# Patient Record
Sex: MALE | Race: WHITE | NOT HISPANIC OR LATINO | Employment: FULL TIME | URBAN - METROPOLITAN AREA
[De-identification: names, ages, dates, MRNs, and addresses within clinical notes are randomized per-mention and may not be internally consistent; named-entity substitution may affect disease eponyms.]

---

## 2019-07-03 ENCOUNTER — OFFICE VISIT (OUTPATIENT)
Dept: INTERNAL MEDICINE | Facility: CLINIC | Age: 29
End: 2019-07-03
Payer: COMMERCIAL

## 2019-07-03 VITALS
BODY MASS INDEX: 25.51 KG/M2 | HEIGHT: 66 IN | SYSTOLIC BLOOD PRESSURE: 116 MMHG | HEART RATE: 88 BPM | WEIGHT: 158.75 LBS | OXYGEN SATURATION: 98 % | DIASTOLIC BLOOD PRESSURE: 62 MMHG

## 2019-07-03 DIAGNOSIS — M54.50 ACUTE LOW BACK PAIN WITHOUT SCIATICA, UNSPECIFIED BACK PAIN LATERALITY: Primary | ICD-10-CM

## 2019-07-03 PROCEDURE — 99999 PR PBB SHADOW E&M-NEW PATIENT-LVL III: ICD-10-PCS | Mod: PBBFAC,,, | Performed by: INTERNAL MEDICINE

## 2019-07-03 PROCEDURE — 99203 OFFICE O/P NEW LOW 30 MIN: CPT | Mod: S$GLB,,, | Performed by: INTERNAL MEDICINE

## 2019-07-03 PROCEDURE — 99203 PR OFFICE/OUTPT VISIT, NEW, LEVL III, 30-44 MIN: ICD-10-PCS | Mod: S$GLB,,, | Performed by: INTERNAL MEDICINE

## 2019-07-03 PROCEDURE — 3008F BODY MASS INDEX DOCD: CPT | Mod: CPTII,S$GLB,, | Performed by: INTERNAL MEDICINE

## 2019-07-03 PROCEDURE — 99999 PR PBB SHADOW E&M-NEW PATIENT-LVL III: CPT | Mod: PBBFAC,,, | Performed by: INTERNAL MEDICINE

## 2019-07-03 PROCEDURE — 3008F PR BODY MASS INDEX (BMI) DOCUMENTED: ICD-10-PCS | Mod: CPTII,S$GLB,, | Performed by: INTERNAL MEDICINE

## 2019-07-03 RX ORDER — CYCLOBENZAPRINE HCL 10 MG
TABLET ORAL
Qty: 30 TABLET | Refills: 1 | Status: SHIPPED | OUTPATIENT
Start: 2019-07-03

## 2019-07-03 RX ORDER — MELOXICAM 15 MG/1
15 TABLET ORAL DAILY
Qty: 30 TABLET | Refills: 1 | Status: SHIPPED | OUTPATIENT
Start: 2019-07-03

## 2019-07-03 RX ORDER — DULOXETIN HYDROCHLORIDE 60 MG/1
CAPSULE, DELAYED RELEASE ORAL
COMMUNITY
Start: 2019-02-01

## 2019-07-03 NOTE — PROGRESS NOTES
Subjective:       Patient ID: Samy Pena is a 28 y.o. male.    Chief Complaint: Back Pain    HPI   1 month ago moved a heavy TV.    Had central lower back pain after.  Aggravated by picking up 30 lb dog.  Now c/o pain with lying down - has spasms.  Walking steps today caused severe pain.  Stood against wall and felt better..  Some pain in legs today.  No numbness, weakness.  He has taken tylenol, stretching (helps a bit).  Advil not helpful (200 mg)..   Pain is usually mild, but sometimes severe.    No fever, bladder or bowel incontinence.    .  No exercise.    Social anxiety, tx with cymbalta  Review of Systems   Constitutional: Negative for activity change and unexpected weight change.   Respiratory: Negative for chest tightness and shortness of breath.    Cardiovascular: Negative for chest pain and leg swelling.   Gastrointestinal: Negative for abdominal pain.   Musculoskeletal: Positive for back pain.   Neurological: Negative for headaches.   Psychiatric/Behavioral: Negative for dysphoric mood. The patient is nervous/anxious.        Objective:      Physical Exam   Constitutional: He is oriented to person, place, and time. He appears well-developed and well-nourished. No distress.   Neurological: He is alert and oriented to person, place, and time.   Psychiatric: He has a normal mood and affect. His behavior is normal.       Assessment:       1. Acute low back pain without sciatica, unspecified back pain laterality        Plan:       Samy was seen today for back pain.    Diagnoses and all orders for this visit:    Acute low back pain without sciatica, unspecified back pain laterality  -     meloxicam (MOBIC) 15 MG tablet; Take 1 tablet (15 mg total) by mouth once daily.  -     cyclobenzaprine (FLEXERIL) 10 MG tablet; Half -1 tab po q hs for back spasm       Ice to back.       Call if no better.

## 2019-07-03 NOTE — PATIENT INSTRUCTIONS
Meloxicam 15 mg daily - (anti-inflammatory) once a day with food.  Don't mix with advil, aleve, etc.    Flexeril (muscle relaxer) half-1 tab at bedtime.    Ice to back.    walk

## 2025-07-03 ENCOUNTER — HOSPITAL ENCOUNTER (OUTPATIENT)
Dept: RADIOLOGY | Facility: HOSPITAL | Age: 35
Discharge: HOME | End: 2025-07-03
Attending: INTERNAL MEDICINE
Payer: COMMERCIAL

## 2025-07-03 ENCOUNTER — OFFICE VISIT (OUTPATIENT)
Dept: INTERNAL MEDICINE | Facility: CLINIC | Age: 35
End: 2025-07-03
Payer: COMMERCIAL

## 2025-07-03 ENCOUNTER — RESULTS FOLLOW-UP (OUTPATIENT)
Dept: INTERNAL MEDICINE | Facility: CLINIC | Age: 35
End: 2025-07-03

## 2025-07-03 VITALS
BODY MASS INDEX: 23.65 KG/M2 | OXYGEN SATURATION: 98 % | HEIGHT: 66 IN | SYSTOLIC BLOOD PRESSURE: 108 MMHG | DIASTOLIC BLOOD PRESSURE: 62 MMHG | HEART RATE: 68 BPM | TEMPERATURE: 96 F | WEIGHT: 147.19 LBS

## 2025-07-03 DIAGNOSIS — E01.0 THYROMEGALY: ICD-10-CM

## 2025-07-03 DIAGNOSIS — Z00.00 ANNUAL PHYSICAL EXAM: Primary | ICD-10-CM

## 2025-07-03 DIAGNOSIS — F41.9 ANXIETY DISORDER, UNSPECIFIED TYPE: ICD-10-CM

## 2025-07-03 PROBLEM — M54.2 NECK PAIN: Status: ACTIVE | Noted: 2021-01-25

## 2025-07-03 PROCEDURE — 3008F BODY MASS INDEX DOCD: CPT | Mod: CPTII,S$GLB,, | Performed by: INTERNAL MEDICINE

## 2025-07-03 PROCEDURE — 3078F DIAST BP <80 MM HG: CPT | Mod: CPTII,S$GLB,, | Performed by: INTERNAL MEDICINE

## 2025-07-03 PROCEDURE — 1160F RVW MEDS BY RX/DR IN RCRD: CPT | Mod: CPTII,S$GLB,, | Performed by: INTERNAL MEDICINE

## 2025-07-03 PROCEDURE — 1159F MED LIST DOCD IN RCRD: CPT | Mod: CPTII,S$GLB,, | Performed by: INTERNAL MEDICINE

## 2025-07-03 PROCEDURE — 76536 US EXAM OF HEAD AND NECK: CPT | Mod: 26,,, | Performed by: RADIOLOGY

## 2025-07-03 PROCEDURE — 3074F SYST BP LT 130 MM HG: CPT | Mod: CPTII,S$GLB,, | Performed by: INTERNAL MEDICINE

## 2025-07-03 PROCEDURE — 99999 PR PBB SHADOW E&M-NEW PATIENT-LVL III: CPT | Mod: PBBFAC,,, | Performed by: INTERNAL MEDICINE

## 2025-07-03 PROCEDURE — 99385 PREV VISIT NEW AGE 18-39: CPT | Mod: S$GLB,,, | Performed by: INTERNAL MEDICINE

## 2025-07-03 PROCEDURE — 76536 US EXAM OF HEAD AND NECK: CPT | Mod: TC

## 2025-07-03 NOTE — PROGRESS NOTES
Assessment:       1. Annual physical exam  - CBC Auto Differential; Future  - Comprehensive Metabolic Panel; Future  - TSH; Future  - Lipid Panel; Future    2. Anxiety disorder, unspecified type    3. Thyromegaly  - US Soft Tissue Head Neck; Future        Plan:       1. Check CBC, CMP, TSH, lipids.  Discussed diet and exercise.  Discussed vaccines.  2. Continue Cymbalta 20 mg.  3. Check ultrasound.    Deep Scribe:  IMPRESSION:  1. Annual exam for 34-year-old male.  2. Reviewed alcohol consumption, smoking status, and sexual activity.  3. Family history of prostate cancer in father at mid-60s; will start screening at age 40.  4. Evaluated thyroid concerns; palpated thyroid and found it to be slightly enlarged.  5. Considered potential need for biopsy pending US results.    SUMMARY:   Order thyroid ultrasound   Order TSH, CBC, Chemistry panel, and Lipid panel   Proceed to 5th floor for lab work   Follow up to schedule thyroid ultrasound   Contact office if biopsy or further investigation needed based on ultrasound results    ANNUAL PHYSICAL EXAM:   Ordered CBC.   Ordered Chemistry panel.   Ordered Lipid panel.   Proceed to 5th floor for lab work.    THYROMEGALY:   Ordered thyroid US for further evaluation.   Ordered TSH.   Follow up to schedule thyroid US.   Contact the office if biopsy or further investigation is needed based on US results.                 This note was generated with the assistance of ambient listening technology. Verbal consent was obtained by the patient and accompanying visitor(s) for the recording of patient appointment to facilitate this note. I attest to having reviewed and edited the generated note for accuracy, though some syntax or spelling errors may persist. Please contact the author of this note for any clarification.       Subjective:       Patient ID: Samy Pena is a 34 y.o. male.    Chief Complaint: Thyroid Problem and Annual Exam    HPI    34 y.o. male here for annual exam.      Cholesterol: needs  Vaccines: Influenza - done; Tetanus - 2022; COVID - 3 done  Sexual Screening: active  STD screening: no concern  Eye exam: done last a few years ago  Prostate: father with cancer mid 60s  Colonoscopy: No family history of cancer    Exercise: not as much as he should be.  He walks his dog 1-2 times a week.  Diet: mix of fast food, home cooked (mostly), eating out (weekends)    History of Present Illness    CHIEF COMPLAINT:  Mr. Pena presents today for annual exam.    THYROID:  His wife, a medical student, noted his thyroid was palpable during a practice physical exam. He seeks evaluation of this finding, as his wife indicated that palpating the thyroid is unusual. He denies associated symptoms such as neck discomfort, swelling, or changes in voice or swallowing.    MEDICATIONS:  He currently takes Cymbalta 20 mg daily for anxiety management, which is managed by psychiatrist Dr. Reji Gomez.    SOCIAL HISTORY:  He drinks alcohol approximately once per month, denies smoking and illicit drug use. He is sexually active without STD concerns.    DIET AND EXERCISE:  He reports a mixed diet consisting of home-cooked meals during weekdays and occasional eating out on weekends. Exercise is limited to dog walks once or twice weekly for 20-30 minutes. He acknowledges exercising less than recommended.    FAMILY HISTORY:  His father was diagnosed with prostate cancer in his mid-60s, which was detected early and treated.    SURGICAL HISTORY:  He has a history of PRK eye surgery performed years ago with no ongoing issues related to the procedure.      ROS:  Psychiatric: +anxiety         Review of Systems          Objective:      Physical Exam  Vitals reviewed.   Constitutional:       Appearance: He is well-developed.   HENT:      Head: Normocephalic and atraumatic.      Mouth/Throat:      Pharynx: No oropharyngeal exudate.   Eyes:      General: No scleral icterus.        Right eye: No discharge.          Left eye: No discharge.      Pupils: Pupils are equal, round, and reactive to light.   Neck:      Thyroid: Thyromegaly present.      Trachea: No tracheal deviation.   Cardiovascular:      Rate and Rhythm: Normal rate and regular rhythm.      Heart sounds: Normal heart sounds. No murmur heard.     No friction rub. No gallop.   Pulmonary:      Effort: Pulmonary effort is normal. No respiratory distress.      Breath sounds: Normal breath sounds. No wheezing or rales.   Chest:      Chest wall: No tenderness.   Abdominal:      General: Bowel sounds are normal. There is no distension.      Palpations: Abdomen is soft. There is no mass.      Tenderness: There is no abdominal tenderness. There is no guarding or rebound.   Musculoskeletal:         General: No tenderness. Normal range of motion.      Cervical back: Normal range of motion and neck supple.   Skin:     General: Skin is warm and dry.      Coloration: Skin is not pale.      Findings: No erythema or rash.   Neurological:      Mental Status: He is alert and oriented to person, place, and time.   Psychiatric:         Behavior: Behavior normal.